# Patient Record
Sex: MALE | Race: WHITE
[De-identification: names, ages, dates, MRNs, and addresses within clinical notes are randomized per-mention and may not be internally consistent; named-entity substitution may affect disease eponyms.]

---

## 2019-03-22 ENCOUNTER — HOSPITAL ENCOUNTER (EMERGENCY)
Dept: HOSPITAL 80 - FED | Age: 39
Discharge: HOME | End: 2019-03-22
Payer: COMMERCIAL

## 2019-03-22 VITALS — SYSTOLIC BLOOD PRESSURE: 165 MMHG | DIASTOLIC BLOOD PRESSURE: 90 MMHG

## 2019-03-22 DIAGNOSIS — F17.200: ICD-10-CM

## 2019-03-22 DIAGNOSIS — J20.8: Primary | ICD-10-CM

## 2019-03-22 NOTE — EDPHY
H & P


Stated Complaint: Dizzy/SOB/chest tight x1wk, worse today, prod cough-brown


Time Seen by Provider: 03/22/19 14:43





- Personal History


Current Tetanus/Diphtheria Vaccine: No





- Medical/Surgical History


Hx Asthma: Yes


Hx Chronic Respiratory Disease: No


Hx Diabetes: No


Hx Cardiac Disease: No


Hx Renal Disease: No


Hx Cirrhosis: No


Hx Alcoholism: No


Hx HIV/AIDS: No


Hx Splenectomy or Spleen Trauma: No


Other PMH: none





- Social History


Smoking Status: Heavy smoker


Constitutional: 


 Initial Vital Signs











Temperature (C)  37.3 C   03/22/19 14:26


 


Heart Rate  97   03/22/19 14:26


 


Respiratory Rate  18   03/22/19 14:26


 


Blood Pressure  174/98 H  03/22/19 14:26


 


O2 Sat (%)  93   03/22/19 14:26








 











O2 Delivery Mode               Room Air














Allergies/Adverse Reactions: 


 





Penicillins Allergy (Verified 03/22/19 14:26)


 








Home Medications: 














 Medication  Instructions  Recorded


 


NO HOME MEDICATIONS  12/22/11


 


oxyCODONE/APAP 5/325 [Percocet] 1 - 2 tab PO Q4-6PRN PRN #17 tab 12/22/11


 


Albuterol [Proventil Inhaler] 1 - 2 puffs IH Q4 #1 mdi 03/22/19


 


Azithromycin [Zithromax] 250 mg PO DAILY #6 tab 03/22/19


 


methylPREDNISolone [Medrol Dose 1 each PO AD #1 ea 03/22/19





Jitendra]  














Medical Decision Making





- Diagnostics


Imaging Results: 


 Imaging Impressions





Chest X-Ray  03/22/19 14:52


Impression: Mild central bronchitis; otherwise negative.











Imaging: Discussed imaging studies w/ On call Radiologist, I viewed and 

interpreted images myself


ED Course/Re-evaluation: 





CHIEF COMPLAINT: Sore throat, chest pain, shortness of breath





HISTORY OF PRESENT ILLNESS:  


The patient is a 39 y/o male complaining of a sore throat, cough, and headache 

onset 1 week ago. The patient initially developed a sore throat and found it 

difficult to swallow. He then developed minor chest pain with shortness of 

breath. Three days ago he developed a cough in the morning associated with 

brown sputum. He did have night sweats for one night but does not have body 

aches or fever. Currently he also has a headache and feels mildly dizzy. Due to 

these symptoms he became concerned and decided to present to the emergency 

department. He denies receiving an influenza vaccination this year. No fever, 

lightheadedness, heart palpitations, abdominal pain, urinary or bowel complaints

, numbness, paresthesias.





REVIEW OF SYSTEMS:  





A comprehensive 10 system review of systems is otherwise negative aside from 

elements mentioned in the history of present illness and medical decision 

making.





PHYSICAL EXAM:  





HR, BP, O2 Sat, RR.  Temp noted


General Appearance:  Alert, well hydrated, appropriate, and non-toxic appearing.


Head:  Atraumatic without scalp tenderness or obvious injury


Eyes:  Pupils equal, round, reactive to light and accommodation, EOMI, no trauma

, no injection.


Ears:  Clear bilaterally, no perforation, normal landmarks


Nose:  Atraumatic, no rhinorrhea, clear.


Throat: Oropharyngeal erythema with bilateral tonsillar exudates that are worse 

on the right. No lesions, normal tonsils, mucus membranes moist.


Neck:  Supple, 2+ carotid upstroke, nontender, no lymphadenopathy.


Respiratory:  Bilateral coarse rhonchi in all fields with end expiratory 

wheezes. No retractions, no distress, and no accessory muscle use. 


Cardiovascular:  Regular rate and rhythm, no murmurs, rubs, or gallops. 

Bilateral carotid, radial, dorsalis pedis, and posterior tibial pulses intact. 

Good capillary refill all extremities.


Gastrointestinal:  Abdomen is soft, nontender, non-distended, no masses, no 

rebound, no guarding, no peritoneal signs.


Musculoskeletal:  Normal active ROM of all extremities, atraumatic.


Neurological:  Alert, appropriate, and interactive.  The patient has normal 

DTRs and non-focal cranial nerves, motor, sensory, and cerebellar exam.


Skin:  No rashes, good turgor, no nodules on palpation.





Past medical history: Denies


Past surgical history: Denies


Family history: Denies


Social history: Family at bedside, employed, smoker





DIAGNOSTICS/PROCEDURES/CRITICAL CARE TIME:  





Chest x-ray: Mild central bronchitis





DIFFERENTIAL DIAGNOSIS:   


The differential diagnosis for the patient's shortness of breath and hypoxemia 

included but was not limited to bronchitis, pneumonia, myocardial infarction, 

acute mountain sickness, high altitude pulmonary edema, congestive heart failure

, and pulmonary embolus.





MEDICAL DECISION MAKING:  


The patient is a 39 y/o male presenting with a sore throat, cough, and headache 

onset 1 week ago. On exam the patient has oropharyngeal erythema with bilateral 

tonsillar exudates that are worse on the right. He also has bilateral coarse 

rhonchi in all fields with end expiratory wheezes. I suspect the patient has a 

bronchitis or strep viral infection and is not having a cardiac event. Chest x-

ray ordered; DuoNeb administered.





1520: I spoke with Dr. Rai and Dr. Meza, radiologists, regarding patient's 

chest x-ray. There is a mild central bronchitis.





1521: Reassessed patient and discussed imaging studies. He feels mildly better 

after the DuoNeb. I have prescribed him a Z-pack, Medrol dose pack, and 

albuterol inhaler for his bronchitis. I have also discussed with the patient 

how his symptoms reveal a low likelihood of a cardiac event. Return precautions 

provided; patient is comfortable with this plan.








- Data Points


Medications Given: 


 








Discontinued Medications





Albuterol/Ipratropium (Duoneb)  3 ml IH EDNOW ONE


   Stop: 03/22/19 14:52


   Last Admin: 03/22/19 14:57 Dose:  3 ml








Departure





- Departure


Disposition: Home, Routine, Self-Care


Clinical Impression: 


Acute bronchitis


Qualifiers:


 Bronchitis organism: other organism Qualified Code(s): J20.8 - Acute 

bronchitis due to other specified organisms





Condition: Good


Instructions:  Acute Bronchitis (ED)


Additional Instructions: 


1. Take the Z-pack as directed.


2. Take the Medrol dose pack as prescribed.


3. Use the inhaler as prescribed.


4. Follow-up with your primary doctor within 72 hours.  


5. Return to the Emergency Department for fever, chest pain, shortness of breath

, increasing pain or other worsening of condition.


Referrals: 


[facility],. [Clinic] - As per Instructions


Claus Trinidad DO [Medical Doctor] - As per Instructions


Prescriptions: 


Albuterol [Proventil Inhaler] 1 - 2 puffs IH Q4 #1 mdi


Azithromycin [Zithromax] 250 mg PO DAILY #6 tab


methylPREDNISolone [Medrol Dose Jitendra] 1 each PO AD #1 ea


Report Scribed for: Bryan De León


Report Scribed by: Mary Madrid


Date of Report: 03/22/19


Time of Report: 14:45